# Patient Record
Sex: FEMALE | URBAN - METROPOLITAN AREA
[De-identification: names, ages, dates, MRNs, and addresses within clinical notes are randomized per-mention and may not be internally consistent; named-entity substitution may affect disease eponyms.]

---

## 2019-12-04 ENCOUNTER — OFFICE VISIT (OUTPATIENT)
Dept: URBAN - METROPOLITAN AREA CLINIC 23 | Facility: CLINIC | Age: 79
End: 2019-12-04
Payer: MEDICARE

## 2019-12-04 DIAGNOSIS — H01.024 SQUAMOUS BLEPHARITIS LEFT UPPER EYELID: ICD-10-CM

## 2019-12-04 PROCEDURE — 99213 OFFICE O/P EST LOW 20 MIN: CPT | Performed by: OPHTHALMOLOGY

## 2019-12-04 RX ORDER — NEOMYCIN SULFATE, POLYMYXIN B SULFATE AND DEXAMETHASONE 3.5; 10000; 1 MG/G; [USP'U]/G; MG/G
OINTMENT OPHTHALMIC
Qty: 1 | Refills: 1 | Status: ACTIVE
Start: 2019-12-04

## 2019-12-04 ASSESSMENT — INTRAOCULAR PRESSURE
OD: 13
OS: 15

## 2019-12-04 ASSESSMENT — KERATOMETRY
OD: 41.75
OS: 42.50

## 2019-12-04 NOTE — IMPRESSION/PLAN
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 Condition: quality of life issue. Symptoms: will treat with meds. 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 Discussed treatment options with patient. Discussed risks of progression. New medication(s) Rx given today. Start Maxitrol roya bid BUL, BLL. Consult recommended [[facility]].

## 2020-01-21 ENCOUNTER — OFFICE VISIT (OUTPATIENT)
Dept: URBAN - METROPOLITAN AREA CLINIC 23 | Facility: CLINIC | Age: 80
End: 2020-01-21
Payer: MEDICARE

## 2020-01-21 DIAGNOSIS — H01.021 SQUAMOUS BLEPHARITIS RIGHT UPPER EYELID: Primary | ICD-10-CM

## 2020-01-21 PROCEDURE — 99214 OFFICE O/P EST MOD 30 MIN: CPT | Performed by: OPHTHALMOLOGY

## 2020-01-21 ASSESSMENT — INTRAOCULAR PRESSURE
OD: 9
OS: 10

## 2020-01-21 NOTE — IMPRESSION/PLAN
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 Improving. Plan: Discussed diagnosis in detail with patient. Discussed treatment options with patient. Patient to start Ocusoft Plus Lid Scrubs QAM. Importance of hygiene was explained. Patient to continue Maxitrol BID x2weeks, then QD for 1 week. No further Oculoplastic intervention needed.